# Patient Record
Sex: FEMALE | Race: WHITE | Employment: PART TIME | URBAN - METROPOLITAN AREA
[De-identification: names, ages, dates, MRNs, and addresses within clinical notes are randomized per-mention and may not be internally consistent; named-entity substitution may affect disease eponyms.]

---

## 2017-10-26 PROBLEM — L90.0 LICHEN SCLEROSUS: Status: ACTIVE | Noted: 2017-10-26

## 2017-10-26 PROCEDURE — 87624 HPV HI-RISK TYP POOLED RSLT: CPT | Performed by: OBSTETRICS & GYNECOLOGY

## 2017-10-26 PROCEDURE — 88175 CYTOPATH C/V AUTO FLUID REDO: CPT | Performed by: OBSTETRICS & GYNECOLOGY

## 2020-10-16 ENCOUNTER — OFFICE VISIT (OUTPATIENT)
Dept: ORTHOPEDICS CLINIC | Facility: CLINIC | Age: 65
End: 2020-10-16
Payer: COMMERCIAL

## 2020-10-16 DIAGNOSIS — S82.875A CLOSED NONDISPLACED PILON FRACTURE OF LEFT TIBIA, INITIAL ENCOUNTER: Primary | ICD-10-CM

## 2020-10-16 PROCEDURE — 99203 OFFICE O/P NEW LOW 30 MIN: CPT | Performed by: ORTHOPAEDIC SURGERY

## 2020-10-16 PROCEDURE — 29425 APPL SHORT LEG CAST WALKING: CPT | Performed by: ORTHOPAEDIC SURGERY

## 2020-10-16 NOTE — PROGRESS NOTES
Patient is a 42-year-old white female who injured her right ankle while hiking at [place] on the weekend. Patient slipped on some acorns and injured her right ankle and was unable to bear weight. Patient was seen at a local hospital had x-rays taken.

## 2020-10-30 ENCOUNTER — OFFICE VISIT (OUTPATIENT)
Dept: ORTHOPEDICS CLINIC | Facility: CLINIC | Age: 65
End: 2020-10-30
Payer: COMMERCIAL

## 2020-10-30 ENCOUNTER — HOSPITAL ENCOUNTER (OUTPATIENT)
Dept: GENERAL RADIOLOGY | Age: 65
Discharge: HOME | End: 2020-10-30
Attending: ORTHOPAEDIC SURGERY
Payer: COMMERCIAL

## 2020-10-30 DIAGNOSIS — S82.891D CLOSED FRACTURE OF RIGHT ANKLE WITH ROUTINE HEALING, SUBSEQUENT ENCOUNTER: Primary | ICD-10-CM

## 2020-10-30 DIAGNOSIS — S82.891D CLOSED FRACTURE OF RIGHT ANKLE WITH ROUTINE HEALING, SUBSEQUENT ENCOUNTER: ICD-10-CM

## 2020-10-30 PROCEDURE — 73610 X-RAY EXAM OF ANKLE: CPT | Performed by: ORTHOPAEDIC SURGERY

## 2020-10-30 PROCEDURE — 99024 POSTOP FOLLOW-UP VISIT: CPT | Performed by: ORTHOPAEDIC SURGERY

## 2020-10-30 NOTE — PROGRESS NOTES
Patient is a 68-year-old white female here for follow-up. Patient had a fracture of her distal tibia. Patient was placed in a short leg cast and is nonweightbearing. Patient states that her ankle is feeling much better today.   Patient's  is with

## 2020-11-05 ENCOUNTER — TELEPHONE (OUTPATIENT)
Dept: ORTHOPEDICS CLINIC | Facility: CLINIC | Age: 65
End: 2020-11-05

## 2020-11-05 NOTE — TELEPHONE ENCOUNTER
Patient is calling, to follow up with Richie Anne in regards to leave of absence forms. Forms need to be turned in by Monday 11/9.

## 2020-11-06 NOTE — TELEPHONE ENCOUNTER
Patient wants a status update.  Patient has not had a call back and wants a call back today as her FMLA ppw is time sensitive

## 2020-11-17 ENCOUNTER — HOSPITAL ENCOUNTER (OUTPATIENT)
Dept: GENERAL RADIOLOGY | Facility: HOSPITAL | Age: 65
Discharge: HOME | End: 2020-11-17
Attending: ORTHOPAEDIC SURGERY
Payer: COMMERCIAL

## 2020-11-17 ENCOUNTER — OFFICE VISIT (OUTPATIENT)
Dept: ORTHOPEDICS CLINIC | Facility: CLINIC | Age: 65
End: 2020-11-17
Payer: COMMERCIAL

## 2020-11-17 DIAGNOSIS — S82.891D RIGHT MALLEOLAR FRACTURE, CLOSED, WITH ROUTINE HEALING, SUBSEQUENT ENCOUNTER: Primary | ICD-10-CM

## 2020-11-17 DIAGNOSIS — S82.891D RIGHT MALLEOLAR FRACTURE, CLOSED, WITH ROUTINE HEALING, SUBSEQUENT ENCOUNTER: ICD-10-CM

## 2020-11-17 PROCEDURE — 73610 X-RAY EXAM OF ANKLE: CPT | Performed by: ORTHOPAEDIC SURGERY

## 2020-11-17 PROCEDURE — 99024 POSTOP FOLLOW-UP VISIT: CPT | Performed by: ORTHOPAEDIC SURGERY

## 2020-11-17 NOTE — PROGRESS NOTES
Patient is a 70-year-old white female here for follow-up. Patient had a fracture of her distal tibia of her right ankle. Been approximately 5-1/2 weeks now.  is with her today. Patient's complained of some minor pain at times.     Exam shows her

## 2020-12-08 ENCOUNTER — HOSPITAL ENCOUNTER (OUTPATIENT)
Dept: GENERAL RADIOLOGY | Facility: HOSPITAL | Age: 65
Discharge: HOME | End: 2020-12-08
Attending: ORTHOPAEDIC SURGERY
Payer: COMMERCIAL

## 2020-12-08 ENCOUNTER — OFFICE VISIT (OUTPATIENT)
Dept: ORTHOPEDICS CLINIC | Facility: CLINIC | Age: 65
End: 2020-12-08
Payer: COMMERCIAL

## 2020-12-08 DIAGNOSIS — S82.891D RIGHT MALLEOLAR FRACTURE, CLOSED, WITH ROUTINE HEALING, SUBSEQUENT ENCOUNTER: Primary | ICD-10-CM

## 2020-12-08 DIAGNOSIS — S82.891D RIGHT MALLEOLAR FRACTURE, CLOSED, WITH ROUTINE HEALING, SUBSEQUENT ENCOUNTER: ICD-10-CM

## 2020-12-08 PROCEDURE — L1906 AFO MULTILIG ANK SUP PRE OTS: HCPCS | Performed by: PHYSICIAN ASSISTANT

## 2020-12-08 PROCEDURE — 73610 X-RAY EXAM OF ANKLE: CPT | Performed by: ORTHOPAEDIC SURGERY

## 2020-12-08 PROCEDURE — 99024 POSTOP FOLLOW-UP VISIT: CPT | Performed by: ORTHOPAEDIC SURGERY

## 2020-12-08 NOTE — PROGRESS NOTES
Patient is a 28-year-old white female here for follow-up. Patient had the fracture of her distal right tibia approximately 8 weeks ago. Patient's  is with her today. Patient states that she is doing fairly well.   Patient has been ambulating in a

## 2021-01-06 ENCOUNTER — OFFICE VISIT (OUTPATIENT)
Dept: ORTHOPEDICS CLINIC | Facility: CLINIC | Age: 66
End: 2021-01-06
Payer: COMMERCIAL

## 2021-01-06 DIAGNOSIS — S82.891D RIGHT MALLEOLAR FRACTURE, CLOSED, WITH ROUTINE HEALING, SUBSEQUENT ENCOUNTER: Primary | ICD-10-CM

## 2021-01-06 PROCEDURE — 99212 OFFICE O/P EST SF 10 MIN: CPT | Performed by: ORTHOPAEDIC SURGERY

## 2021-01-06 RX ORDER — FLUTICASONE PROPIONATE 50 MCG
SPRAY, SUSPENSION (ML) NASAL
COMMUNITY
Start: 2020-08-28

## 2021-01-06 NOTE — PROGRESS NOTES
Patient is a 70-year-old white female here for follow-up. Patient is now approximately 3 months status post fracture of her medial malleolus of her right ankle. Patient's states she has some stiffness of the joint but otherwise doing very well.     Exam s

## (undated) NOTE — LETTER
01/06/21      To whom it may concern:    Sloan Trujillo may return to work on Wednesday January 13th without restrictions      Dx: Right ankle fracture                                                                                                 Luigi Spence MD